# Patient Record
Sex: MALE | URBAN - METROPOLITAN AREA
[De-identification: names, ages, dates, MRNs, and addresses within clinical notes are randomized per-mention and may not be internally consistent; named-entity substitution may affect disease eponyms.]

---

## 2024-10-07 ENCOUNTER — LAB OUTSIDE AN ENCOUNTER (OUTPATIENT)
Dept: URBAN - METROPOLITAN AREA CLINIC 113 | Facility: CLINIC | Age: 34
End: 2024-10-07

## 2024-10-07 ENCOUNTER — DASHBOARD ENCOUNTERS (OUTPATIENT)
Age: 34
End: 2024-10-07

## 2024-10-07 ENCOUNTER — OFFICE VISIT (OUTPATIENT)
Dept: URBAN - METROPOLITAN AREA CLINIC 113 | Facility: CLINIC | Age: 34
End: 2024-10-07
Payer: OTHER GOVERNMENT

## 2024-10-07 VITALS
WEIGHT: 241.8 LBS | RESPIRATION RATE: 18 BRPM | HEIGHT: 69 IN | DIASTOLIC BLOOD PRESSURE: 95 MMHG | BODY MASS INDEX: 35.81 KG/M2 | SYSTOLIC BLOOD PRESSURE: 124 MMHG | TEMPERATURE: 98.1 F | HEART RATE: 83 BPM

## 2024-10-07 DIAGNOSIS — E61.1 IRON DEFICIENCY: ICD-10-CM

## 2024-10-07 DIAGNOSIS — K21.9 GERD: ICD-10-CM

## 2024-10-07 PROCEDURE — 99244 OFF/OP CNSLTJ NEW/EST MOD 40: CPT | Performed by: NURSE PRACTITIONER

## 2024-10-07 PROCEDURE — 99204 OFFICE O/P NEW MOD 45 MIN: CPT | Performed by: NURSE PRACTITIONER

## 2024-10-07 RX ORDER — IBUPROFEN 800 MG/1
1 TABLET WITH FOOD OR MILK AS NEEDED TABLET, FILM COATED ORAL
Status: ACTIVE | COMMUNITY

## 2024-10-07 RX ORDER — LOSARTAN POTASSIUM 100 MG/1
1 TABLET TABLET, FILM COATED ORAL ONCE A DAY
Status: ACTIVE | COMMUNITY

## 2024-10-07 RX ORDER — ESOMEPRAZOLE MAGNESIUM 40 MG/1
1 CAPSULE CAPSULE, DELAYED RELEASE PELLETS ORAL ONCE A DAY
Qty: 90 CAPSULE | Refills: 3 | OUTPATIENT
Start: 2024-10-07

## 2024-10-07 RX ORDER — METHOCARBAMOL 750 MG/1
1 TABLET TABLET ORAL
Status: ACTIVE | COMMUNITY

## 2024-10-07 RX ORDER — ESCITALOPRAM OXALATE 20 MG/1
1 TABLET TABLET, FILM COATED ORAL ONCE A DAY
Status: ACTIVE | COMMUNITY

## 2024-10-07 RX ORDER — MELATONIN 3 MG
1 TABLET AT BEDTIME AS NEEDED TABLET ORAL ONCE A DAY
Status: ACTIVE | COMMUNITY

## 2024-10-07 RX ORDER — SUMATRIPTAN SUCCINATE 25 MG/1
AS DIRECTED TABLET, FILM COATED ORAL
Status: ACTIVE | COMMUNITY

## 2024-10-07 NOTE — HPI-TODAY'S VISIT:
34-year-old male with a history of hypertension, sleep apnea, PTSD, hyperlipidemia, osteoarthritis, referred by Dr. Eloy Mendoza for evaluation of an iron deficiency anemia.  A copy of this document is being forwarded to the referring provider. He complains of chronic, intermittent reflux symptoms which have been worsening recently. He experiences heartburn and indigestion following a morning cup of coffee, with intermittent episodes of nighttime regurgitation which will awaken him from sleep. He denies associated dysphagia, abdominal pain, nausea or vomiting. Nexium has been helpful in the past, but he has not been on acid suppressive therapy in some time. Recent labs with his PCP incidentally noted iron deficiency. He was started on oral iron. He takes ibuprofen sparingly. He has never had an EGD. Labs 6/12/2024:H/H14.2/43.4, ferritin 18, folate 11.6.

## 2024-10-08 LAB
ABSOLUTE BASOPHILS: 29
ABSOLUTE EOSINOPHILS: 241
ABSOLUTE LYMPHOCYTES: 1781
ABSOLUTE MONOCYTES: 621
ABSOLUTE NEUTROPHILS: 4628
BASOPHILS: 0.4
EOSINOPHILS: 3.3
FERRITIN, SERUM: 23
HEMATOCRIT: 46
HEMOGLOBIN: 14.8
IRON BIND.CAP.(TIBC): 375
IRON SATURATION: 14
IRON: 51
LYMPHOCYTES: 24.4
MCH: 27.2
MCHC: 32.2
MCV: 84.4
MONOCYTES: 8.5
MPV: 9.6
NEUTROPHILS: 63.4
PLATELET COUNT: 348
RDW: 13.7
RED BLOOD CELL COUNT: 5.45
WHITE BLOOD CELL COUNT: 7.3

## 2024-11-05 ENCOUNTER — CLAIMS CREATED FROM THE CLAIM WINDOW (OUTPATIENT)
Dept: URBAN - METROPOLITAN AREA CLINIC 4 | Facility: CLINIC | Age: 34
End: 2024-11-05
Payer: OTHER GOVERNMENT

## 2024-11-05 ENCOUNTER — OFFICE VISIT (OUTPATIENT)
Dept: URBAN - METROPOLITAN AREA SURGERY CENTER 25 | Facility: SURGERY CENTER | Age: 34
End: 2024-11-05
Payer: OTHER GOVERNMENT

## 2024-11-05 DIAGNOSIS — K44.9 HIATAL HERNIA: ICD-10-CM

## 2024-11-05 DIAGNOSIS — K29.60 OTHER GASTRITIS WITHOUT BLEEDING: ICD-10-CM

## 2024-11-05 DIAGNOSIS — R12 BURNING REFLUX: ICD-10-CM

## 2024-11-05 DIAGNOSIS — K31.89 ACHYLIA: ICD-10-CM

## 2024-11-05 DIAGNOSIS — D50.9 ANEMIA: ICD-10-CM

## 2024-11-05 DIAGNOSIS — K29.60 ADENOPAPILLOMATOSIS GASTRICA: ICD-10-CM

## 2024-11-05 DIAGNOSIS — D50.9 IRON DEFICIENCY ANEMIA, UNSPECIFIED: ICD-10-CM

## 2024-11-05 DIAGNOSIS — K25.9 GASTRIC ULCER, UNSPECIFIED AS ACUTE OR CHRONIC, WITHOUT HEMORRHAGE OR PERFORATION: ICD-10-CM

## 2024-11-05 DIAGNOSIS — B96.81 BACTERIAL INFECTION DUE TO H. PYLORI: ICD-10-CM

## 2024-11-05 DIAGNOSIS — B96.81 HELICOBACTER PYLORI [H. PYLORI] AS THE CAUSE OF DISEASES CLASSIFIED ELSEWHERE: ICD-10-CM

## 2024-11-05 DIAGNOSIS — K31.89 OTHER DISEASES OF STOMACH AND DUODENUM: ICD-10-CM

## 2024-11-05 PROBLEM — 23913003: Status: ACTIVE | Noted: 2024-11-05

## 2024-11-05 PROCEDURE — 00731 ANES UPR GI NDSC PX NOS: CPT | Performed by: NURSE ANESTHETIST, CERTIFIED REGISTERED

## 2024-11-05 PROCEDURE — 43239 EGD BIOPSY SINGLE/MULTIPLE: CPT | Performed by: STUDENT IN AN ORGANIZED HEALTH CARE EDUCATION/TRAINING PROGRAM

## 2024-11-05 PROCEDURE — 88342 IMHCHEM/IMCYTCHM 1ST ANTB: CPT | Performed by: PATHOLOGY

## 2024-11-05 PROCEDURE — 88305 TISSUE EXAM BY PATHOLOGIST: CPT | Performed by: PATHOLOGY

## 2024-11-05 RX ORDER — HYDROCORTISONE ACETATE AND PRAMOXINE HYDROCHLORIDE 25; 10 MG/ML; MG/ML
1 APPLICATION LOTION TOPICAL THREE TIMES A DAY
Qty: 1 KIT | Refills: 0 | OUTPATIENT
Start: 2024-11-05 | End: 2024-11-12

## 2024-11-05 RX ORDER — ESOMEPRAZOLE MAGNESIUM 40 MG/1
1 CAPSULE CAPSULE, DELAYED RELEASE PELLETS ORAL ONCE A DAY
Qty: 90 CAPSULE | Refills: 3 | Status: ACTIVE | COMMUNITY
Start: 2024-10-07

## 2024-11-05 RX ORDER — METHOCARBAMOL 750 MG/1
1 TABLET TABLET ORAL
Status: ACTIVE | COMMUNITY

## 2024-11-05 RX ORDER — SUMATRIPTAN SUCCINATE 25 MG/1
AS DIRECTED TABLET, FILM COATED ORAL
Status: ACTIVE | COMMUNITY

## 2024-11-05 RX ORDER — ESCITALOPRAM OXALATE 20 MG/1
1 TABLET TABLET, FILM COATED ORAL ONCE A DAY
Status: ACTIVE | COMMUNITY

## 2024-11-05 RX ORDER — MELATONIN 3 MG
1 TABLET AT BEDTIME AS NEEDED TABLET ORAL ONCE A DAY
Status: ACTIVE | COMMUNITY

## 2024-11-05 RX ORDER — LOSARTAN POTASSIUM 100 MG/1
1 TABLET TABLET, FILM COATED ORAL ONCE A DAY
Status: ACTIVE | COMMUNITY

## 2024-11-05 RX ORDER — IBUPROFEN 800 MG/1
1 TABLET WITH FOOD OR MILK AS NEEDED TABLET, FILM COATED ORAL
Status: ACTIVE | COMMUNITY

## 2024-11-05 RX ORDER — ESOMEPRAZOLE MAGNESIUM 40 MG/1
1 CAPSULE CAPSULE, DELAYED RELEASE PELLETS ORAL ONCE A DAY
Qty: 90 CAPSULE | Refills: 3
Start: 2024-10-07

## 2024-11-19 ENCOUNTER — TELEPHONE ENCOUNTER (OUTPATIENT)
Dept: URBAN - METROPOLITAN AREA CLINIC 113 | Facility: CLINIC | Age: 34
End: 2024-11-19

## 2024-11-19 RX ORDER — OMEPRAZOLE 40 MG/1
1 CAPSULE 30 MINUTES BEFORE MORNING AND EVENING MEAL CAPSULE, DELAYED RELEASE ORAL TWICE A DAY
Qty: 28 | OUTPATIENT
Start: 2024-11-19

## 2024-11-19 RX ORDER — DOXYCYCLINE HYCLATE 100 MG/1
1 TABLET TABLET, COATED ORAL TWICE A DAY
Qty: 28 TABLET | OUTPATIENT
Start: 2024-11-19 | End: 2024-12-03

## 2024-11-19 RX ORDER — METRONIDAZOLE 500 MG/1
1 TABLET TABLET ORAL THREE TIMES A DAY
Qty: 42 TABLET | OUTPATIENT
Start: 2024-11-19 | End: 2024-12-03

## 2024-12-10 ENCOUNTER — OFFICE VISIT (OUTPATIENT)
Dept: URBAN - METROPOLITAN AREA CLINIC 113 | Facility: CLINIC | Age: 34
End: 2024-12-10

## 2025-01-06 ENCOUNTER — OFFICE VISIT (OUTPATIENT)
Dept: URBAN - METROPOLITAN AREA CLINIC 113 | Facility: CLINIC | Age: 35
End: 2025-01-06
Payer: OTHER GOVERNMENT

## 2025-01-06 ENCOUNTER — LAB OUTSIDE AN ENCOUNTER (OUTPATIENT)
Dept: URBAN - METROPOLITAN AREA CLINIC 113 | Facility: CLINIC | Age: 35
End: 2025-01-06

## 2025-01-06 VITALS
BODY MASS INDEX: 36.88 KG/M2 | HEIGHT: 69 IN | HEART RATE: 85 BPM | TEMPERATURE: 98.1 F | SYSTOLIC BLOOD PRESSURE: 137 MMHG | WEIGHT: 249 LBS | DIASTOLIC BLOOD PRESSURE: 84 MMHG

## 2025-01-06 DIAGNOSIS — A04.8 H. PYLORI INFECTION: ICD-10-CM

## 2025-01-06 DIAGNOSIS — R19.7 DIARRHEA: ICD-10-CM

## 2025-01-06 DIAGNOSIS — K21.9 GERD: ICD-10-CM

## 2025-01-06 DIAGNOSIS — E61.1 IRON DEFICIENCY: ICD-10-CM

## 2025-01-06 PROCEDURE — 99214 OFFICE O/P EST MOD 30 MIN: CPT | Performed by: NURSE PRACTITIONER

## 2025-01-06 RX ORDER — METHOCARBAMOL 750 MG/1
1 TABLET TABLET ORAL
Status: ACTIVE | COMMUNITY

## 2025-01-06 RX ORDER — OMEPRAZOLE 40 MG/1
1 CAPSULE 30 MINUTES BEFORE MORNING AND EVENING MEAL CAPSULE, DELAYED RELEASE ORAL TWICE A DAY
Qty: 28 | Status: ON HOLD | COMMUNITY
Start: 2024-11-19

## 2025-01-06 RX ORDER — SUMATRIPTAN SUCCINATE 25 MG/1
AS DIRECTED TABLET, FILM COATED ORAL
Status: ACTIVE | COMMUNITY

## 2025-01-06 RX ORDER — ESOMEPRAZOLE MAGNESIUM 40 MG/1
1 CAPSULE CAPSULE, DELAYED RELEASE PELLETS ORAL ONCE A DAY
Qty: 90 CAPSULE | Refills: 3 | Status: ACTIVE | COMMUNITY
Start: 2024-10-07

## 2025-01-06 RX ORDER — ESCITALOPRAM OXALATE 20 MG/1
1 TABLET TABLET, FILM COATED ORAL ONCE A DAY
Status: ON HOLD | COMMUNITY

## 2025-01-06 RX ORDER — LOSARTAN POTASSIUM 100 MG/1
1 TABLET TABLET, FILM COATED ORAL ONCE A DAY
Status: ACTIVE | COMMUNITY

## 2025-01-06 RX ORDER — SODIUM, POTASSIUM,MAG SULFATES 17.5-3.13G
177ML AS DIRECTED SOLUTION, RECONSTITUTED, ORAL ORAL
Qty: 1 KIT | Refills: 0 | OUTPATIENT
Start: 2025-01-06 | End: 2025-01-08

## 2025-01-06 RX ORDER — ESOMEPRAZOLE MAGNESIUM 40 MG/1
1 CAPSULE CAPSULE, DELAYED RELEASE PELLETS ORAL ONCE A DAY
OUTPATIENT
Start: 2024-10-07

## 2025-01-06 RX ORDER — IBUPROFEN 800 MG/1
1 TABLET WITH FOOD OR MILK AS NEEDED TABLET, FILM COATED ORAL
Status: ON HOLD | COMMUNITY

## 2025-01-06 RX ORDER — MELATONIN 3 MG
1 TABLET AT BEDTIME AS NEEDED TABLET ORAL ONCE A DAY
Status: ACTIVE | COMMUNITY

## 2025-01-06 RX ORDER — SERTRALINE HYDROCHLORIDE 100 MG/1
1 TABLET TABLET, FILM COATED ORAL ONCE A DAY
Status: ACTIVE | COMMUNITY

## 2025-01-06 NOTE — HPI-TODAY'S VISIT:
34-year-old male with a history of hypertension, sleep apnea, PTSD, hyperlipidemia, osteoarthritis, presenting for follow-up after EGD, performed for evaluation of GERD and iron deficiency.  He was seen in the office in October for evaluation of increasing GERD symptoms with recent labs incidentally noting iron deficiency. He was to resume daily Nexium given prior response, and an upper endoscopy was planned. Labs were recommended to trend his hemoglobin and assess response to oral iron. EGD 11/5/2024:Normal esophagus, regular Z-line, gastroesophageal flap valve classified as Hill grade 4, gastric erosions, normal examined duodenum.  Gastric biopsy showed H. pylori, negative for dysplasia.  Duodenal biopsies were unremarkable, negative for sprue.  He was recommended treatment with a 14-day course of metronidazole, doxycycline, bismuth, and omeprazole. Labs 10/7/2024:H/H14.8/46, MCV 84.4, , WBC 7.3.  Iron 51, TIBC 375, iron sat 14.  Ferritin 23. His reflux symptoms have subsided with reinitiation of acid suppressive therapy. He completed the H pylori regimen in early December. He was on twice daily PPI while on antibiotic therapy, but has since reduced to once daily Nexium. He denies any dysphagia or GERD symptoms on this regimen. He admits to previous routine use of ibuprofen which he has discontinued since the EGD. He has chronic, intermittent mid abdominal pain and episodes of diarrhea which occur about twice per week. He has occasional small volume red blood on the toilet paper with wiping. He is no longer on oral iron.

## 2025-02-03 ENCOUNTER — LAB OUTSIDE AN ENCOUNTER (OUTPATIENT)
Dept: URBAN - METROPOLITAN AREA CLINIC 113 | Facility: CLINIC | Age: 35
End: 2025-02-03

## 2025-02-10 ENCOUNTER — CLAIMS CREATED FROM THE CLAIM WINDOW (OUTPATIENT)
Dept: URBAN - METROPOLITAN AREA SURGERY CENTER 25 | Facility: SURGERY CENTER | Age: 35
End: 2025-02-10
Payer: OTHER GOVERNMENT

## 2025-02-10 ENCOUNTER — LAB OUTSIDE AN ENCOUNTER (OUTPATIENT)
Dept: URBAN - METROPOLITAN AREA CLINIC 113 | Facility: CLINIC | Age: 35
End: 2025-02-10

## 2025-02-10 ENCOUNTER — TELEPHONE ENCOUNTER (OUTPATIENT)
Dept: URBAN - METROPOLITAN AREA CLINIC 113 | Facility: CLINIC | Age: 35
End: 2025-02-10

## 2025-02-10 ENCOUNTER — LAB OUTSIDE AN ENCOUNTER (OUTPATIENT)
Dept: URBAN - METROPOLITAN AREA SURGERY CENTER 25 | Facility: SURGERY CENTER | Age: 35
End: 2025-02-10

## 2025-02-10 ENCOUNTER — CLAIMS CREATED FROM THE CLAIM WINDOW (OUTPATIENT)
Dept: URBAN - METROPOLITAN AREA CLINIC 4 | Facility: CLINIC | Age: 35
End: 2025-02-10
Payer: OTHER GOVERNMENT

## 2025-02-10 DIAGNOSIS — K63.89 BACTERIAL OVERGROWTH SYNDROME: ICD-10-CM

## 2025-02-10 DIAGNOSIS — R19.7 ACUTE DIARRHEA: ICD-10-CM

## 2025-02-10 DIAGNOSIS — D50.9 IRON DEFICIENCY ANEMIA, UNSPECIFIED: ICD-10-CM

## 2025-02-10 DIAGNOSIS — K63.89 OTHER SPECIFIED DISEASES OF INTESTINE: ICD-10-CM

## 2025-02-10 DIAGNOSIS — D50.9 ANEMIA: ICD-10-CM

## 2025-02-10 DIAGNOSIS — K64.0 FIRST DEGREE HEMORRHOIDS: ICD-10-CM

## 2025-02-10 PROCEDURE — 88313 SPECIAL STAINS GROUP 2: CPT | Performed by: PATHOLOGY

## 2025-02-10 PROCEDURE — 45380 COLONOSCOPY AND BIOPSY: CPT | Performed by: STUDENT IN AN ORGANIZED HEALTH CARE EDUCATION/TRAINING PROGRAM

## 2025-02-10 PROCEDURE — 00811 ANES LWR INTST NDSC NOS: CPT | Performed by: ANESTHESIOLOGY

## 2025-02-10 PROCEDURE — 88342 IMHCHEM/IMCYTCHM 1ST ANTB: CPT | Performed by: PATHOLOGY

## 2025-02-10 PROCEDURE — 88305 TISSUE EXAM BY PATHOLOGIST: CPT | Performed by: PATHOLOGY

## 2025-02-10 PROCEDURE — 00811 ANES LWR INTST NDSC NOS: CPT | Performed by: NURSE ANESTHETIST, CERTIFIED REGISTERED

## 2025-02-10 RX ORDER — ESCITALOPRAM OXALATE 20 MG/1
1 TABLET TABLET, FILM COATED ORAL ONCE A DAY
Status: ON HOLD | COMMUNITY

## 2025-02-10 RX ORDER — SERTRALINE HYDROCHLORIDE 100 MG/1
1 TABLET TABLET, FILM COATED ORAL ONCE A DAY
Status: ACTIVE | COMMUNITY

## 2025-02-10 RX ORDER — IBUPROFEN 800 MG/1
1 TABLET WITH FOOD OR MILK AS NEEDED TABLET, FILM COATED ORAL
Status: ON HOLD | COMMUNITY

## 2025-02-10 RX ORDER — METHOCARBAMOL 750 MG/1
1 TABLET TABLET ORAL
Status: ACTIVE | COMMUNITY

## 2025-02-10 RX ORDER — MELATONIN 3 MG
1 TABLET AT BEDTIME AS NEEDED TABLET ORAL ONCE A DAY
Status: ACTIVE | COMMUNITY

## 2025-02-10 RX ORDER — LOSARTAN POTASSIUM 100 MG/1
1 TABLET TABLET, FILM COATED ORAL ONCE A DAY
Status: ACTIVE | COMMUNITY

## 2025-02-10 RX ORDER — SUMATRIPTAN SUCCINATE 25 MG/1
AS DIRECTED TABLET, FILM COATED ORAL
Status: ACTIVE | COMMUNITY

## 2025-02-10 RX ORDER — ESOMEPRAZOLE MAGNESIUM 40 MG/1
1 CAPSULE CAPSULE, DELAYED RELEASE PELLETS ORAL ONCE A DAY
Status: ACTIVE | COMMUNITY
Start: 2024-10-07

## 2025-02-10 RX ORDER — OMEPRAZOLE 40 MG/1
1 CAPSULE 30 MINUTES BEFORE MORNING AND EVENING MEAL CAPSULE, DELAYED RELEASE ORAL TWICE A DAY
Qty: 28 | Status: ON HOLD | COMMUNITY
Start: 2024-11-19

## 2025-03-11 ENCOUNTER — OFFICE VISIT (OUTPATIENT)
Dept: URBAN - METROPOLITAN AREA CLINIC 113 | Facility: CLINIC | Age: 35
End: 2025-03-11
Payer: OTHER GOVERNMENT

## 2025-03-11 VITALS
DIASTOLIC BLOOD PRESSURE: 93 MMHG | HEIGHT: 69 IN | RESPIRATION RATE: 18 BRPM | HEART RATE: 88 BPM | BODY MASS INDEX: 37.36 KG/M2 | SYSTOLIC BLOOD PRESSURE: 130 MMHG | WEIGHT: 252.2 LBS | TEMPERATURE: 98.1 F

## 2025-03-11 DIAGNOSIS — K58.0 IRRITABLE BOWEL SYNDROME WITH DIARRHEA: ICD-10-CM

## 2025-03-11 DIAGNOSIS — A04.8 H. PYLORI INFECTION: ICD-10-CM

## 2025-03-11 DIAGNOSIS — E61.1 IRON DEFICIENCY: ICD-10-CM

## 2025-03-11 PROCEDURE — 99213 OFFICE O/P EST LOW 20 MIN: CPT | Performed by: NURSE PRACTITIONER

## 2025-03-11 RX ORDER — LOSARTAN POTASSIUM 100 MG/1
1 TABLET TABLET, FILM COATED ORAL ONCE A DAY
Status: ACTIVE | COMMUNITY

## 2025-03-11 RX ORDER — METHOCARBAMOL 750 MG/1
1 TABLET TABLET ORAL
Status: ACTIVE | COMMUNITY

## 2025-03-11 RX ORDER — IBUPROFEN 800 MG/1
1 TABLET WITH FOOD OR MILK AS NEEDED TABLET, FILM COATED ORAL
Status: ON HOLD | COMMUNITY

## 2025-03-11 RX ORDER — MELATONIN 3 MG
1 TABLET AT BEDTIME AS NEEDED TABLET ORAL ONCE A DAY
Status: ACTIVE | COMMUNITY

## 2025-03-11 RX ORDER — OMEPRAZOLE 40 MG/1
1 CAPSULE 30 MINUTES BEFORE MORNING AND EVENING MEAL CAPSULE, DELAYED RELEASE ORAL TWICE A DAY
Qty: 28 | Status: ON HOLD | COMMUNITY
Start: 2024-11-19

## 2025-03-11 RX ORDER — SERTRALINE HYDROCHLORIDE 100 MG/1
1 TABLET TABLET, FILM COATED ORAL ONCE A DAY
Status: ACTIVE | COMMUNITY

## 2025-03-11 RX ORDER — ESCITALOPRAM OXALATE 20 MG/1
1 TABLET TABLET, FILM COATED ORAL ONCE A DAY
Status: ON HOLD | COMMUNITY

## 2025-03-11 RX ORDER — SUMATRIPTAN SUCCINATE 25 MG/1
AS DIRECTED TABLET, FILM COATED ORAL
Status: ACTIVE | COMMUNITY

## 2025-03-11 RX ORDER — ESOMEPRAZOLE MAGNESIUM 40 MG/1
1 CAPSULE CAPSULE, DELAYED RELEASE PELLETS ORAL ONCE A DAY
Status: ACTIVE | COMMUNITY
Start: 2024-10-07

## 2025-03-11 NOTE — HPI-OTHER HISTORIES
EGD 11/5/2024:Normal esophagus, regular Z-line, gastroesophageal flap valve classified as Hill grade 4, gastric erosions, normal examined duodenum. Gastric biopsy showed H. pylori, negative for dysplasia. Duodenal biopsies were unremarkable, negative for sprue. He was recommended treatment with a 14-day course of metronidazole, doxycycline, bismuth, and omeprazole. Labs 10/7/2024:H/H14.8/46, MCV 84.4, , WBC 7.3. Iron 51, TIBC 375, iron sat 14. Ferritin 23. Labs 6/12/2024:H/H14.2/43.4, ferritin 18, folate 11.6.
